# Patient Record
Sex: FEMALE | Race: WHITE | Employment: FULL TIME | URBAN - METROPOLITAN AREA
[De-identification: names, ages, dates, MRNs, and addresses within clinical notes are randomized per-mention and may not be internally consistent; named-entity substitution may affect disease eponyms.]

---

## 2020-07-21 ENCOUNTER — PROCEDURE VISIT (OUTPATIENT)
Dept: AUDIOLOGY | Age: 55
End: 2020-07-21
Payer: COMMERCIAL

## 2020-07-21 PROCEDURE — 92567 TYMPANOMETRY: CPT | Performed by: AUDIOLOGIST

## 2020-07-21 PROCEDURE — 92557 COMPREHENSIVE HEARING TEST: CPT | Performed by: AUDIOLOGIST

## 2020-07-21 NOTE — PROGRESS NOTES
This patient was seen for audiometric/tympanometric testing. Per patient's insurance, she is eligible for new hearing aids and is interested in acquiring hearing aids, if warranted. Audiometry revealed a moderate-to-moderately-severe sensorineural hearing loss, thorugh the frequency range, bilaterally. Reliability was good. Speech reception thresholds were in good agreement with the pure tone averages, bilaterally. Speech discrimination scores were 96%, bilaterally at 80-85dBHL. Today's test results were similar to her previous audiometric testing, in 2017. Tympanometry revealed normal middle ear peak pressure and compliance, bilaterally. The results were reviewed with the patient. Patient's information was sent to her insurance provider, for hearing aid benefit verification. Patient will be forwarding benefits from her provider handbook, via email.     Stef Loaiza [facility] [employer]

## 2020-08-20 ENCOUNTER — PROCEDURE VISIT (OUTPATIENT)
Dept: AUDIOLOGY | Age: 55
End: 2020-08-20

## 2020-08-20 PROCEDURE — 99999 PR OFFICE/OUTPT VISIT,PROCEDURE ONLY: CPT | Performed by: AUDIOLOGIST

## 2020-08-20 NOTE — PROGRESS NOTES
Fit with binaural Margy Chroman rechargeable hearing aids. Instructed in use and care. Connected bluetooth and martha to hearing aids (called bluetooth support). Scheduled 30 day follow up for 9/24. Made following adjustments: decreased to 90% gain, increased to mild occlusion compensation. Patient was satisfied and will follow up on above date, unless problems arise.      Lenard Corado [facility]  [address] [facility] [id number]    Electronically signed by Lenard Corado on 8/20/2020 at 4:21 PM

## 2020-10-01 ENCOUNTER — PROCEDURE VISIT (OUTPATIENT)
Dept: AUDIOLOGY | Age: 55
End: 2020-10-01
Payer: COMMERCIAL

## 2020-10-01 PROCEDURE — V5253 HEARING AID, PROG, BIN, BTE: HCPCS | Performed by: AUDIOLOGIST

## 2020-10-01 PROCEDURE — V5160 DISPENSING FEE BINAURAL: HCPCS | Performed by: AUDIOLOGIST

## 2020-10-05 NOTE — PROGRESS NOTES
Patient seen for 30-day hearing aid check. She is not experiencing any issues with her hearing aids and is very happy with her fitting. She has elected to keep her hearing aids and is requesting they be billed to her insurance, per her hearing aid benefits. Patient reminded to contact this department should she experience any issues with her hearing aids.

## 2022-05-14 ENCOUNTER — HOSPITAL ENCOUNTER (OUTPATIENT)
Dept: MAMMOGRAPHY | Age: 57
Discharge: HOME | End: 2022-05-16
Payer: COMMERCIAL

## 2022-05-14 DIAGNOSIS — Z12.31 VISIT FOR SCREENING MAMMOGRAM: ICD-10-CM

## 2022-05-14 PROCEDURE — 77067 SCR MAMMO BI INCL CAD: CPT

## 2023-08-17 ENCOUNTER — PROCEDURE VISIT (OUTPATIENT)
Dept: AUDIOLOGY | Age: 58
End: 2023-08-17
Payer: COMMERCIAL

## 2023-08-17 DIAGNOSIS — H90.3 SENSORINEURAL HEARING LOSS, BILATERAL: Primary | ICD-10-CM

## 2023-08-17 PROCEDURE — 92557 COMPREHENSIVE HEARING TEST: CPT | Performed by: AUDIOLOGIST

## 2023-08-17 NOTE — PROGRESS NOTES
This patient was referred for audiometric testing by her PCP, due to a longstanding bilateral hearing loss. Patient has been wearing hearing aids, for 3+ years and is interested in obtaining new ones, if warranted. Audiometry using pure tone air and bone conduction testing revealed a moderate-to-severe  sensorineural hearing loss, bilaterally. Reliability was good. Speech reception thresholds were in good agreement with the pure tone averages, bilaterally. Speech discrimination scores were 100%, right ear and 96%, left ear at 70dBHL. The results were reviewed with the patient. The benefits and limitations of amplification were discussed with the patient. It is recommended that the patient be fit with binaural hearing aids. Hearing aids were ordered and confirmation received/scanned in Epic chart. Recommendations for follow up will be made pending physician consult.     Twin Freed CCC/ORIN  Audiologist  [id number]  NPI#:  [id number]      Electronically signed by Lenard Bolaños on 8/17/2023 at 3:18 PM

## 2023-09-18 ENCOUNTER — PROCEDURE VISIT (OUTPATIENT)
Dept: AUDIOLOGY | Age: 58
End: 2023-09-18

## 2023-09-18 DIAGNOSIS — H90.3 SENSORINEURAL HEARING LOSS, BILATERAL: Primary | ICD-10-CM

## 2023-09-18 PROCEDURE — 99024 POSTOP FOLLOW-UP VISIT: CPT | Performed by: AUDIOLOGIST

## 2023-09-18 NOTE — PROGRESS NOTES
Fit with binaural  [employer]  hearing aids. Instructed in use and care. Gave  battery charger, warranty information and scheduled  30 day check for 10/23/23. Made following adjustments: N/A. Hearing aid contract/battery warning form reviewed and signed. Hearing aids paired to phone martha. Patient was satisfied and will follow up on above date, unless problems arise. No charge visit today. Will bill at 30-day hearing aid check, per insurance.     Kiah Beckford CCC/A  Audiologist  [id number]  NPI#:  [id number]    Electronically signed by Lenard Lloyd on 9/18/2023 at 4:02 PM

## 2023-10-23 ENCOUNTER — PROCEDURE VISIT (OUTPATIENT)
Dept: AUDIOLOGY | Age: 58
End: 2023-10-23
Payer: COMMERCIAL

## 2023-10-23 DIAGNOSIS — H90.3 SENSORINEURAL HEARING LOSS, BILATERAL: Primary | ICD-10-CM

## 2023-10-23 PROCEDURE — V5253 HEARING AID, PROG, BIN, BTE: HCPCS | Performed by: AUDIOLOGIST

## 2023-10-23 PROCEDURE — V5160 DISPENSING FEE BINAURAL: HCPCS | Performed by: AUDIOLOGIST

## 2023-10-23 NOTE — PROGRESS NOTES
The patient came in for a 30 day hearing aid follow up with billing, per [facility] Guidelines. Patient reported they are doing well with the hearing aids. Changes to hearing aids today: increased gain to 100%, increased low input gain. Patient is satisfied with the hearing aids and therefore billing through insurance to be done today per medicaid guidelines. Patient to return as needed.       Lenard Liu [facility]  [address] [place] [address]   Electronically signed by Lenard Liu on 10/24/2023 at 12:52 PM

## 2024-05-02 ENCOUNTER — TELEPHONE (OUTPATIENT)
Dept: AUDIOLOGY | Age: 59
End: 2024-05-02

## 2024-05-02 NOTE — TELEPHONE ENCOUNTER
Patient called and left a voicemail message.   Returned patient message. Left a voicemail at the patient contact number.    Needs appointment for hearing aid check.    Electronically signed by Lenard Neri on 5/2/2024 at 10:23 AM

## 2024-05-13 ENCOUNTER — PROCEDURE VISIT (OUTPATIENT)
Dept: AUDIOLOGY | Age: 59
End: 2024-05-13

## 2024-05-13 DIAGNOSIS — H90.3 SENSORINEURAL HEARING LOSS, BILATERAL: Primary | ICD-10-CM

## 2024-05-13 PROCEDURE — 99024 POSTOP FOLLOW-UP VISIT: CPT | Performed by: AUDIOLOGIST

## 2024-05-13 NOTE — PROGRESS NOTES
Left hearing aid not working.  Speaker replaced and no other issues noted.  Patient to return PRN.    Paul Lyles CCC/A  Audiologist  [id number]  NPI#:  [id number]

## 2024-09-16 ENCOUNTER — PROCEDURE VISIT (OUTPATIENT)
Dept: AUDIOLOGY | Age: 59
End: 2024-09-16

## 2024-09-16 DIAGNOSIS — H90.3 SENSORINEURAL HEARING LOSS, BILATERAL: Primary | ICD-10-CM

## 2024-09-16 PROCEDURE — 99024 POSTOP FOLLOW-UP VISIT: CPT

## 2025-04-04 LAB
ALBUMIN SERPL-MCNC: 4.8 G/DL (ref 3.5–5.2)
ALP SERPL-CCNC: 85 U/L (ref 35–104)
ALT SERPL-CCNC: 14 U/L (ref 0–32)
ANION GAP SERPL CALCULATED.3IONS-SCNC: 17 MMOL/L (ref 7–16)
AST SERPL-CCNC: 26 U/L (ref 0–31)
BASOPHILS # BLD: 0.05 K/UL (ref 0–0.2)
BASOPHILS NFR BLD: 1 % (ref 0–2)
BILIRUB SERPL-MCNC: 0.5 MG/DL (ref 0–1.2)
BUN SERPL-MCNC: 13 MG/DL (ref 6–23)
CALCIUM SERPL-MCNC: 9.7 MG/DL (ref 8.6–10.2)
CHLORIDE SERPL-SCNC: 105 MMOL/L (ref 98–107)
CO2 SERPL-SCNC: 23 MMOL/L (ref 22–29)
CREAT SERPL-MCNC: 0.7 MG/DL (ref 0.5–1)
EOSINOPHIL # BLD: 0 K/UL (ref 0.05–0.5)
EOSINOPHILS RELATIVE PERCENT: 0 % (ref 0–6)
ERYTHROCYTE [DISTWIDTH] IN BLOOD BY AUTOMATED COUNT: 13 % (ref 11.5–15)
GFR, ESTIMATED: >90 ML/MIN/1.73M2
GLUCOSE SERPL-MCNC: 100 MG/DL (ref 74–99)
HCT VFR BLD AUTO: 43.2 % (ref 34–48)
HGB BLD-MCNC: 13.9 G/DL (ref 11.5–15.5)
IMM GRANULOCYTES # BLD AUTO: <0.03 K/UL (ref 0–0.58)
IMM GRANULOCYTES NFR BLD: 0 % (ref 0–5)
LYMPHOCYTES NFR BLD: 1.43 K/UL (ref 1.5–4)
LYMPHOCYTES RELATIVE PERCENT: 36 % (ref 20–42)
MCH RBC QN AUTO: 29.3 PG (ref 26–35)
MCHC RBC AUTO-ENTMCNC: 32.2 G/DL (ref 32–34.5)
MCV RBC AUTO: 91.1 FL (ref 80–99.9)
MONOCYTES NFR BLD: 0.27 K/UL (ref 0.1–0.95)
MONOCYTES NFR BLD: 7 % (ref 2–12)
NEUTROPHILS NFR BLD: 55 % (ref 43–80)
NEUTS SEG NFR BLD: 2.19 K/UL (ref 1.8–7.3)
PLATELET # BLD AUTO: 285 K/UL (ref 130–450)
PMV BLD AUTO: 11.6 FL (ref 7–12)
POTASSIUM SERPL-SCNC: 4.2 MMOL/L (ref 3.5–5)
PROT SERPL-MCNC: 7.3 G/DL (ref 6.4–8.3)
RBC # BLD AUTO: 4.74 M/UL (ref 3.5–5.5)
SODIUM SERPL-SCNC: 145 MMOL/L (ref 132–146)
WBC OTHER # BLD: 4 K/UL (ref 4.5–11.5)

## 2025-04-07 LAB
EBV EA-D IGG SER-ACNC: 12 U/ML
EBV INTERPRETATION: ABNORMAL
EBV NA IGG SER IA-ACNC: 37 U/ML
EBV VCA IGG SER-ACNC: 514 U/ML
EBV VCA IGM SER-ACNC: 7 U/ML

## 2025-07-23 ENCOUNTER — HOSPITAL ENCOUNTER (OUTPATIENT)
Age: 60
Discharge: HOME | End: 2025-07-25

## 2025-07-29 LAB — SURGICAL PATHOLOGY REPORT: NORMAL
